# Patient Record
Sex: MALE | Race: WHITE | NOT HISPANIC OR LATINO | Employment: OTHER | URBAN - METROPOLITAN AREA
[De-identification: names, ages, dates, MRNs, and addresses within clinical notes are randomized per-mention and may not be internally consistent; named-entity substitution may affect disease eponyms.]

---

## 2017-09-18 PROBLEM — S22.42XD CLOSED FRACTURE OF MULTIPLE RIBS OF LEFT SIDE WITH ROUTINE HEALING: Status: ACTIVE | Noted: 2017-09-18

## 2017-09-18 PROBLEM — I50.43 ACUTE ON CHRONIC COMBINED SYSTOLIC AND DIASTOLIC CONGESTIVE HEART FAILURE: Status: ACTIVE | Noted: 2017-09-18

## 2017-09-18 PROBLEM — I25.84 CORONARY ARTERY DISEASE DUE TO CALCIFIED CORONARY LESION: Status: ACTIVE | Noted: 2017-09-18

## 2017-09-18 PROBLEM — Z98.61 PERCUTANEOUS TRANSLUMINAL CORONARY ANGIOPLASTY (PTCA) WITHIN LAST 14 TO 24 MONTHS: Status: ACTIVE | Noted: 2017-09-18

## 2017-09-18 PROBLEM — I25.10 CORONARY ARTERY DISEASE DUE TO CALCIFIED CORONARY LESION: Status: ACTIVE | Noted: 2017-09-18

## 2017-12-06 PROBLEM — I25.10 CAD (CORONARY ARTERY DISEASE): Status: ACTIVE | Noted: 2017-12-06

## 2017-12-07 PROBLEM — I10 PRIMARY HYPERTENSION: Status: ACTIVE | Noted: 2017-12-07

## 2017-12-07 PROBLEM — N18.30 CKD (CHRONIC KIDNEY DISEASE) STAGE 3, GFR 30-59 ML/MIN: Status: ACTIVE | Noted: 2017-12-07

## 2017-12-07 PROBLEM — D62 ACUTE BLOOD LOSS ANEMIA: Status: ACTIVE | Noted: 2017-12-07

## 2017-12-07 PROBLEM — T14.8XXA HEMATOMA: Status: ACTIVE | Noted: 2017-12-07

## 2017-12-08 PROBLEM — N17.9 AKI (ACUTE KIDNEY INJURY): Status: ACTIVE | Noted: 2017-12-08

## 2017-12-10 PROBLEM — N17.0 ATN (ACUTE TUBULAR NECROSIS): Status: ACTIVE | Noted: 2017-12-10

## 2017-12-11 PROBLEM — Z75.8 DISCHARGE PLANNING ISSUES: Status: ACTIVE | Noted: 2017-12-11

## 2019-12-18 DIAGNOSIS — S12.9XXA CLOSED FRACTURE OF CERVICAL VERTEBRA, UNSPECIFIED CERVICAL VERTEBRAL LEVEL, INITIAL ENCOUNTER: Primary | ICD-10-CM

## 2020-01-09 ENCOUNTER — HOSPITAL ENCOUNTER (OUTPATIENT)
Dept: RADIOLOGY | Facility: HOSPITAL | Age: 80
Discharge: HOME | End: 2020-01-09
Attending: PHYSICIAN ASSISTANT
Payer: MEDICARE

## 2020-01-09 DIAGNOSIS — S12.9XXA CLOSED FRACTURE OF CERVICAL VERTEBRA, UNSPECIFIED CERVICAL VERTEBRAL LEVEL, INITIAL ENCOUNTER: ICD-10-CM

## 2020-01-09 PROCEDURE — 72040 X-RAY EXAM NECK SPINE 2-3 VW: CPT | Mod: 26,,, | Performed by: RADIOLOGY

## 2020-01-09 PROCEDURE — 72040 X-RAY EXAM NECK SPINE 2-3 VW: CPT | Mod: TC,FY,PO

## 2020-01-09 PROCEDURE — 72040 XR CERVICAL SPINE AP LATERAL: ICD-10-PCS | Mod: 26,,, | Performed by: RADIOLOGY

## 2020-01-10 ENCOUNTER — OFFICE VISIT (OUTPATIENT)
Dept: NEUROSURGERY | Facility: CLINIC | Age: 80
End: 2020-01-10
Payer: MEDICARE

## 2020-01-10 VITALS
HEIGHT: 73 IN | WEIGHT: 186.94 LBS | DIASTOLIC BLOOD PRESSURE: 78 MMHG | BODY MASS INDEX: 24.77 KG/M2 | SYSTOLIC BLOOD PRESSURE: 158 MMHG | HEART RATE: 80 BPM

## 2020-01-10 DIAGNOSIS — S12.9XXA CLOSED FRACTURE OF CERVICAL VERTEBRA, UNSPECIFIED CERVICAL VERTEBRAL LEVEL, INITIAL ENCOUNTER: Primary | ICD-10-CM

## 2020-01-10 PROCEDURE — 99204 PR OFFICE/OUTPT VISIT, NEW, LEVL IV, 45-59 MIN: ICD-10-PCS | Mod: S$PBB,,, | Performed by: PHYSICIAN ASSISTANT

## 2020-01-10 PROCEDURE — 1125F PR PAIN SEVERITY QUANTIFIED, PAIN PRESENT: ICD-10-PCS | Mod: ,,, | Performed by: PHYSICIAN ASSISTANT

## 2020-01-10 PROCEDURE — 1125F AMNT PAIN NOTED PAIN PRSNT: CPT | Mod: ,,, | Performed by: PHYSICIAN ASSISTANT

## 2020-01-10 PROCEDURE — 99214 OFFICE O/P EST MOD 30 MIN: CPT | Mod: PBBFAC,PN | Performed by: PHYSICIAN ASSISTANT

## 2020-01-10 PROCEDURE — 1159F MED LIST DOCD IN RCRD: CPT | Mod: ,,, | Performed by: PHYSICIAN ASSISTANT

## 2020-01-10 PROCEDURE — 99999 PR PBB SHADOW E&M-EST. PATIENT-LVL IV: CPT | Mod: PBBFAC,,, | Performed by: PHYSICIAN ASSISTANT

## 2020-01-10 PROCEDURE — 99204 OFFICE O/P NEW MOD 45 MIN: CPT | Mod: S$PBB,,, | Performed by: PHYSICIAN ASSISTANT

## 2020-01-10 PROCEDURE — 99999 PR PBB SHADOW E&M-EST. PATIENT-LVL IV: ICD-10-PCS | Mod: PBBFAC,,, | Performed by: PHYSICIAN ASSISTANT

## 2020-01-10 PROCEDURE — 1159F PR MEDICATION LIST DOCUMENTED IN MEDICAL RECORD: ICD-10-PCS | Mod: ,,, | Performed by: PHYSICIAN ASSISTANT

## 2020-01-10 NOTE — LETTER
January 14, 2020      [facility]  [address] [facility] [address]           [place] [facility] Neurosurgery  [address] [facility] [address]  Phone: [phone]  Fax: [phone]          Patient: Aubrey Renee   MR Number: [id number]   YOB: 1940   Date of Visit: 1/10/2020       Dear [facility]:    Thank you for referring Aubrey Renee to me for evaluation. Attached you will find relevant portions of my assessment and plan of care.    If you have questions, please do not hesitate to call me. I look forward to following Aubrey Renee along with you.    Sincerely,    Lucretia Torres PA-C    Enclosure  CC:  No Recipients    If you would like to receive this communication electronically, please contact [email or web address] or [phone] to request more information on [employer] access.    For providers and/or their staff who would like to refer a patient to [facility], please contact us through our one-stop-shop provider referral line, Jani Castillo, at [phone].    If you feel you have received this communication in error or would no longer like to receive these types of communications, please e-mail [email or web address]

## 2020-01-14 NOTE — PROGRESS NOTES
[place] - Neurosurgery  Clinic Consult     Consult Requested By: Er, St  PCP: Tommy Schaffer MD    Chief Complaint:   Chief Complaint   Patient presents with    Follow-up     Follow-up from ED. Odontoid fracture. Results of x ray.          Past Medical History:   Diagnosis Date    Abdominal aneurysm     Allergic rhinitis     Anticoagulant long-term use     Arthritis     CHF (congestive heart failure)     COPD (chronic obstructive pulmonary disease)     Coronary artery disease     5 stents    Diabetes mellitus, type 2     Diverticulitis     Elevated PSA     Fall     Hyperlipidemia     Hypertension      Past Surgical History:   Procedure Laterality Date    APPENDECTOMY      CARDIAC DEFIBRILLATOR PLACEMENT      CORONARY ANGIOPLASTY WITH STENT PLACEMENT      times 5 stents had to use impella device.he has seen Dr herron and Dr Gordon.    HEMORRHOID SURGERY       Family History   Problem Relation Age of Onset    Cancer Father     COPD Sister     Cancer Brother      Social History     Tobacco Use    Smoking status: Former Smoker     Packs/day: 1.00     Types: Cigarettes    Smokeless tobacco: Never Used    Tobacco comment: quit smoking 35 yrs ago   Substance Use Topics    Alcohol use: No    Drug use: No      Review of patient's allergies indicates:   Allergen Reactions    Statins-hmg-coa reductase inhibitors        Current Outpatient Medications:     acetaminophen (TYLENOL) 325 MG tablet, Take 650 mg by mouth every 4 (four) hours as needed for Pain., Disp: , Rfl:     cholecalciferol, vitamin D3, (VITAMIN D3 ORAL), Take 1,000 Units by mouth once daily., Disp: , Rfl:     difluprednate (DUREZOL) 0.05 % Drop ophthalmic solution, Place 1 drop into both eyes 2 (two) times daily., Disp: , Rfl:     DULoxetine (CYMBALTA) 60 MG capsule, Take 60 mg by mouth once daily., Disp: , Rfl:     ferrous sulfate 325 (65 FE) MG EC tablet, Take 325 mg by mouth once daily., Disp: , Rfl:     finasteride (PROSCAR)  5 mg tablet, Take 5 mg by mouth once daily., Disp: , Rfl:     insulin detemir U-100 (LEVEMIR) 100 unit/mL injection, Inject 10 Units into the skin every evening., Disp: , Rfl:     insulin regular 100 unit/mL Inj injection, Inject into the skin. Per sliding scale:         BS                      UNITS 151-200mg/dl              3 units 201-250mg/dl              6 unis 251-300mg/dl              9 units 301-350mg/dl            12 units 351-400mg/dl            13 units 401-450mg/dl            18 units   >450                        20 units, then call MD, Disp: , Rfl:     meclizine (ANTIVERT) 25 mg tablet, Take 25 mg by mouth every 6 (six) hours., Disp: , Rfl:     metoprolol succinate (TOPROL-XL) 25 MG 24 hr tablet, Take 25 mg by mouth once daily., Disp: , Rfl:     midodrine (PROAMATINE) 10 MG tablet, Take 10 mg by mouth 3 (three) times daily., Disp: , Rfl:     nepafenac (ILEVRO) 0.3 % DrpS, Place 1 drop into both eyes once daily., Disp: , Rfl:     nitroGLYCERIN (NITROSTAT) 0.4 MG SL tablet, Place 0.4 mg under the tongue every 5 (five) minutes as needed for Chest pain., Disp: , Rfl:     ranitidine (ZANTAC) 150 MG tablet, Take 150 mg by mouth once daily., Disp: , Rfl:     rosuvastatin (CRESTOR) 20 MG tablet, Take 20 mg by mouth once daily., Disp: , Rfl:     spironolactone (ALDACTONE) 25 MG tablet, Take 12.5 mg by mouth once daily. , Disp: , Rfl:     vit A/vit C/vit E/zinc/copper (PRESERVISION AREDS ORAL), Take 1 capsule by mouth once daily., Disp: , Rfl:     lisinopril (PRINIVIL,ZESTRIL) 5 MG tablet, Take 5 mg by mouth once daily. , Disp: , Rfl:     Review of Systems:   Constitutional: no fever, chills or night sweats. No changes in weight   Eyes: no visual changes   ENT: no nasal congestion or sore throat   Respiratory: no cough or shortness of breath   Cardiovascular: no chest pain or palpitations   Gastrointestinal: no nausea or vomiting   Genitourinary: no hematuria or dysuria   Integument/Breast: no rash  "or pruritis   Hematologic/Lymphatic: no easy bruising or lymphadenopathy   Musculoskeletal: no arthralgias or myalgias.   Neurological: no seizures or tremors   Behavioral/Psych: no auditory or visual hallucinations   Endocrine: no heat or cold intolerance         OBJECTIVE:     Vital Signs (Most Recent):  Pulse: 80 (01/10/20 1321)  BP: (!) 158/78 (01/10/20 1321)  Estimated body mass index is 24.67 kg/m² as calculated from the following:    Height as of this encounter: 6' 1" (1.854 m).    Weight as of this encounter: 84.8 kg (186 lb 15.2 oz).    Physical Exam:   General: well developed, well nourished, no distress.   Neurologic: Alert and oriented. Thought content appropriate.   Head: normocephalic, atraumatic  Eyes: EOMI.  Neck: trachea midline, no JVD   Cardiovascular: no LE edema  Pulmonary: normal respirations, no signs of respiratory distress  Abdomen: non-distended  Sensory: intact to light touch throughout  Skin: Skin is warm, dry and intact.  Motor Strength: Moves all extremities spontaneously with good tone. Full strength. No abnormal movements seen.   DTR's: 2 +   Gait: deferred    Cervical Spine: no TTP      IMAGING  XR cervical spine independently reviewed and agree with findings   FINDINGS:  The bones are osteopenic.  There are large anterior bridging osteophytes from C3 through C7.  Comparison CT more clearly demonstrates a type 2 odontoid fracture which is partially visualized on the open mouth odontoid view on the current study and less well appreciated on the lateral view.  There is normal alignment at the C1-2 articulation.  The facet joints maintain normal articulation.  There is multilevel facet joint arthropathy.  The prevertebral soft tissues are normal.  The airway is patent.    Provider Dictation:     Aubrey Renee is a 79 y.o. male who presents for evaluation of C2 fracture. Patient was seen at [facility] ED on 12/3 following a ground level fall at a doctor's appointment. He is a resident at " [facility]. He was diagnosed with a C2 fracture. Aspen cervical collar was placed on patient and instructed to follow up with our office. Patient denies neck pain. He has not been compliant with wearing his neck pain. Denies UE pain, numbness, tingling, weakness.  XR cervical spine revealed stable C2 fracture.     Given the above, I recommend continuing cervical collar until 12 weeks post fracture. Will repeat XRs in 4 weeks and have patient return to clinic in 8 weeks     Patient verbalized understanding of plan. Encouraged to call with any questions or concerns.     This note was partially dictated using voice recognition software, so please excuse any errors that were not corrected.    Closed fracture of cervical vertebra, unspecified cervical vertebral level, initial encounter  -     X-Ray Cervical Spine AP And Lateral; Future; Expected date: 01/10/2020

## 2020-02-13 ENCOUNTER — HOSPITAL ENCOUNTER (OUTPATIENT)
Dept: RADIOLOGY | Facility: HOSPITAL | Age: 80
Discharge: HOME | End: 2020-02-13
Attending: PHYSICIAN ASSISTANT
Payer: MEDICARE

## 2020-02-13 DIAGNOSIS — S12.9XXA CLOSED FRACTURE OF CERVICAL VERTEBRA, UNSPECIFIED CERVICAL VERTEBRAL LEVEL, INITIAL ENCOUNTER: ICD-10-CM

## 2020-02-13 PROCEDURE — 72040 X-RAY EXAM NECK SPINE 2-3 VW: CPT | Mod: TC,FY,PO

## 2020-02-13 PROCEDURE — 72040 X-RAY EXAM NECK SPINE 2-3 VW: CPT | Mod: 26,,, | Performed by: RADIOLOGY

## 2020-02-13 PROCEDURE — 72040 XR CERVICAL SPINE AP LATERAL: ICD-10-PCS | Mod: 26,,, | Performed by: RADIOLOGY

## 2020-03-06 ENCOUNTER — OFFICE VISIT (OUTPATIENT)
Dept: NEUROSURGERY | Facility: CLINIC | Age: 80
End: 2020-03-06
Payer: MEDICARE

## 2020-03-06 ENCOUNTER — HOSPITAL ENCOUNTER (OUTPATIENT)
Dept: RADIOLOGY | Facility: HOSPITAL | Age: 80
Discharge: HOME | End: 2020-03-06
Attending: PHYSICIAN ASSISTANT
Payer: MEDICARE

## 2020-03-06 VITALS — HEART RATE: 65 BPM | SYSTOLIC BLOOD PRESSURE: 135 MMHG | DIASTOLIC BLOOD PRESSURE: 67 MMHG

## 2020-03-06 DIAGNOSIS — S12.9XXA CLOSED FRACTURE OF CERVICAL VERTEBRA, UNSPECIFIED CERVICAL VERTEBRAL LEVEL, INITIAL ENCOUNTER: ICD-10-CM

## 2020-03-06 DIAGNOSIS — S12.9XXD CLOSED FRACTURE OF CERVICAL VERTEBRA, SUBSEQUENT ENCOUNTER: Primary | ICD-10-CM

## 2020-03-06 PROCEDURE — 99211 OFF/OP EST MAY X REQ PHY/QHP: CPT | Mod: S$PBB,,, | Performed by: PHYSICIAN ASSISTANT

## 2020-03-06 PROCEDURE — 72040 XR CERVICAL SPINE AP LATERAL: ICD-10-PCS | Mod: 26,,, | Performed by: RADIOLOGY

## 2020-03-06 PROCEDURE — 99999 PR PBB SHADOW E&M-EST. PATIENT-LVL III: CPT | Mod: PBBFAC,,, | Performed by: PHYSICIAN ASSISTANT

## 2020-03-06 PROCEDURE — 99999 PR PBB SHADOW E&M-EST. PATIENT-LVL III: ICD-10-PCS | Mod: PBBFAC,,, | Performed by: PHYSICIAN ASSISTANT

## 2020-03-06 PROCEDURE — 72040 X-RAY EXAM NECK SPINE 2-3 VW: CPT | Mod: 26,,, | Performed by: RADIOLOGY

## 2020-03-06 PROCEDURE — 72040 X-RAY EXAM NECK SPINE 2-3 VW: CPT | Mod: TC,FY,PO

## 2020-03-06 PROCEDURE — 99213 OFFICE O/P EST LOW 20 MIN: CPT | Mod: PBBFAC,25,PN | Performed by: PHYSICIAN ASSISTANT

## 2020-03-06 PROCEDURE — 99211 PR OFFICE/OUTPT VISIT, EST, LEVL I: ICD-10-PCS | Mod: S$PBB,,, | Performed by: PHYSICIAN ASSISTANT

## 2020-03-06 NOTE — PROGRESS NOTES
[facility] Neurosurgery  Clinic Progress Note     PCP: Tommy Schaffer MD    Chief Complaint:   Chief Complaint   Patient presents with    Follow-up     Patient denies pain; reports that he is doing much better; wants collar off; pain 0/10         Past Medical History:   Diagnosis Date    Abdominal aneurysm     Allergic rhinitis     Anticoagulant long-term use     Arthritis     CHF (congestive heart failure)     COPD (chronic obstructive pulmonary disease)     Coronary artery disease     5 stents    Diabetes mellitus, type 2     Diverticulitis     Elevated PSA     Fall     Hyperlipidemia     Hypertension      Past Surgical History:   Procedure Laterality Date    APPENDECTOMY      CARDIAC DEFIBRILLATOR PLACEMENT      CORONARY ANGIOPLASTY WITH STENT PLACEMENT      times 5 stents had to use impella device.he has seen Dr herron and Dr Gordon.    HEMORRHOID SURGERY       Family History   Problem Relation Age of Onset    Cancer Father     COPD Sister     Cancer Brother      Social History     Tobacco Use    Smoking status: Former Smoker     Packs/day: 1.00     Types: Cigarettes    Smokeless tobacco: Never Used    Tobacco comment: quit smoking 35 yrs ago   Substance Use Topics    Alcohol use: No    Drug use: No      Review of patient's allergies indicates:   Allergen Reactions    Statins-hmg-coa reductase inhibitors        Current Outpatient Medications:     acetaminophen (TYLENOL) 325 MG tablet, Take 650 mg by mouth every 4 (four) hours as needed for Pain., Disp: , Rfl:     cholecalciferol, vitamin D3, (VITAMIN D3 ORAL), Take 1,000 Units by mouth once daily., Disp: , Rfl:     difluprednate (DUREZOL) 0.05 % Drop ophthalmic solution, Place 1 drop into both eyes 2 (two) times daily., Disp: , Rfl:     DULoxetine (CYMBALTA) 60 MG capsule, Take 60 mg by mouth once daily., Disp: , Rfl:     ferrous sulfate 325 (65 FE) MG EC tablet, Take 325 mg by mouth once daily., Disp: , Rfl:     finasteride  (PROSCAR) 5 mg tablet, Take 5 mg by mouth once daily., Disp: , Rfl:     insulin detemir U-100 (LEVEMIR) 100 unit/mL injection, Inject 10 Units into the skin every evening., Disp: , Rfl:     insulin regular 100 unit/mL Inj injection, Inject into the skin. Per sliding scale:         BS                      UNITS 151-200mg/dl              3 units 201-250mg/dl              6 unis 251-300mg/dl              9 units 301-350mg/dl            12 units 351-400mg/dl            13 units 401-450mg/dl            18 units   >450                        20 units, then call MD, Disp: , Rfl:     lisinopril (PRINIVIL,ZESTRIL) 5 MG tablet, Take 5 mg by mouth once daily. , Disp: , Rfl:     meclizine (ANTIVERT) 25 mg tablet, Take 25 mg by mouth every 6 (six) hours., Disp: , Rfl:     metoprolol succinate (TOPROL-XL) 25 MG 24 hr tablet, Take 25 mg by mouth once daily., Disp: , Rfl:     midodrine (PROAMATINE) 10 MG tablet, Take 10 mg by mouth 3 (three) times daily., Disp: , Rfl:     nepafenac (ILEVRO) 0.3 % DrpS, Place 1 drop into both eyes once daily., Disp: , Rfl:     nitroGLYCERIN (NITROSTAT) 0.4 MG SL tablet, Place 0.4 mg under the tongue every 5 (five) minutes as needed for Chest pain., Disp: , Rfl:     ranitidine (ZANTAC) 150 MG tablet, Take 150 mg by mouth once daily., Disp: , Rfl:     rosuvastatin (CRESTOR) 20 MG tablet, Take 20 mg by mouth once daily., Disp: , Rfl:     spironolactone (ALDACTONE) 25 MG tablet, Take 12.5 mg by mouth once daily. , Disp: , Rfl:     vit A/vit C/vit E/zinc/copper (PRESERVISION AREDS ORAL), Take 1 capsule by mouth once daily., Disp: , Rfl:     Review of Systems:   Constitutional: no fever, chills or night sweats. No changes in weight   Musculoskeletal: no arthralgias or myalgias.   Neurological: no seizures or tremors         OBJECTIVE:     Vital Signs (Most Recent):  Pulse: 65 (03/06/20 1119)  BP: 135/67 (03/06/20 1119)  Estimated body mass index is 24.67 kg/m² as calculated from the  "following:    Height as of 1/10/20: 6' 1" (1.854 m).    Weight as of 1/10/20: 84.8 kg (186 lb 15.2 oz).    Physical Exam:   General: well developed, well nourished, no distress.   Skin: Skin is warm, dry and intact.  Motor Strength: Moves all extremities spontaneously with good tone  Gait: deferred    Cervical Spine: no TTP, full ROM      IMAGING  XR cervical spine independently reviewed and agree with findings   FINDINGS:  The odontoid fracture is demonstrated on the open mouth view.  The fracture lucency is somewhat less conspicuous which could represent some degree of interval healing.  There is no detectable change in offset at the fracture site.  Please see above discussion.    Provider Dictation:     Aubrey Renee is a 80 y.o. male who presents for follow up of C2 fracture. He is now 13 weeks s/p ground level fall with diagnosis of C2 fracture. Compliant with collar. Denies neck pain.     Stable x-rays. Less conspicuous fracture. Has been immobilized for 12 weeks. Ok to stop collar. Follow up as needed     Patient verbalized understanding of plan. Encouraged to call with any questions or concerns.     This note was partially dictated using voice recognition software, so please excuse any errors that were not corrected.    Closed fracture of cervical vertebra, subsequent encounter        "